# Patient Record
Sex: FEMALE | Race: WHITE | NOT HISPANIC OR LATINO
[De-identification: names, ages, dates, MRNs, and addresses within clinical notes are randomized per-mention and may not be internally consistent; named-entity substitution may affect disease eponyms.]

---

## 2017-06-06 ENCOUNTER — APPOINTMENT (OUTPATIENT)
Dept: SURGERY | Facility: CLINIC | Age: 82
End: 2017-06-06

## 2017-11-07 ENCOUNTER — OTHER (OUTPATIENT)
Age: 82
End: 2017-11-07

## 2018-07-24 ENCOUNTER — APPOINTMENT (OUTPATIENT)
Dept: SURGERY | Facility: CLINIC | Age: 83
End: 2018-07-24
Payer: MEDICARE

## 2018-07-24 PROCEDURE — 99213 OFFICE O/P EST LOW 20 MIN: CPT

## 2018-10-02 ENCOUNTER — OTHER (OUTPATIENT)
Age: 83
End: 2018-10-02

## 2019-10-10 ENCOUNTER — APPOINTMENT (OUTPATIENT)
Dept: SURGERY | Facility: CLINIC | Age: 84
End: 2019-10-10
Payer: MEDICARE

## 2019-10-10 DIAGNOSIS — E04.1 NONTOXIC SINGLE THYROID NODULE: ICD-10-CM

## 2019-10-10 PROCEDURE — 36415 COLL VENOUS BLD VENIPUNCTURE: CPT

## 2019-10-10 PROCEDURE — 99213 OFFICE O/P EST LOW 20 MIN: CPT

## 2019-10-10 NOTE — PHYSICAL EXAM
[de-identified] : no change in left 1 cm thyroid nodules, well circumscribed and mobile [Laryngoscopy Performed] : laryngoscopy was performed, see procedure section for findings [Midline] : located in midline position [Normal] : orientation to person, place, and time: normal

## 2019-10-10 NOTE — ASSESSMENT
[FreeTextEntry1] : will observe.  sonogram requested. bloods drawn. to call next week for results. to return earlier if any change

## 2019-10-10 NOTE — HISTORY OF PRESENT ILLNESS
[de-identified] : prior evaluation of left thyroid nodule. FNA deferred due to small size without suspicious sonographic findings. denies dysphagia, hoarseness or new lesions. no changes medically since last visit

## 2019-10-11 LAB
T3 SERPL-MCNC: 84 NG/DL
T4 FREE SERPL-MCNC: 1.1 NG/DL
THYROGLOB AB SERPL-ACNC: 52.5 IU/ML
THYROPEROXIDASE AB SERPL IA-ACNC: 19.4 IU/ML
TSH SERPL-ACNC: 1.88 UIU/ML

## 2019-10-21 ENCOUNTER — OTHER (OUTPATIENT)
Age: 84
End: 2019-10-21

## 2020-04-16 ENCOUNTER — APPOINTMENT (OUTPATIENT)
Dept: SURGERY | Facility: CLINIC | Age: 85
End: 2020-04-16

## 2021-01-01 ENCOUNTER — NON-APPOINTMENT (OUTPATIENT)
Age: 86
End: 2021-01-01